# Patient Record
Sex: MALE | Race: OTHER | Employment: OTHER | URBAN - METROPOLITAN AREA
[De-identification: names, ages, dates, MRNs, and addresses within clinical notes are randomized per-mention and may not be internally consistent; named-entity substitution may affect disease eponyms.]

---

## 2019-09-14 ENCOUNTER — APPOINTMENT (OUTPATIENT)
Dept: CT IMAGING | Facility: HOSPITAL | Age: 84
DRG: 391 | End: 2019-09-14
Attending: EMERGENCY MEDICINE
Payer: MEDICARE

## 2019-09-14 ENCOUNTER — HOSPITAL ENCOUNTER (INPATIENT)
Facility: HOSPITAL | Age: 84
LOS: 7 days | Discharge: HOME | DRG: 391 | End: 2019-09-21
Attending: EMERGENCY MEDICINE | Admitting: INTERNAL MEDICINE
Payer: MEDICARE

## 2019-09-14 ENCOUNTER — APPOINTMENT (OUTPATIENT)
Dept: GENERAL RADIOLOGY | Facility: HOSPITAL | Age: 84
DRG: 391 | End: 2019-09-14
Attending: EMERGENCY MEDICINE
Payer: MEDICARE

## 2019-09-14 DIAGNOSIS — K57.20 DIVERTICULITIS OF LARGE INTESTINE WITH PERFORATION AND ABSCESS WITHOUT BLEEDING: Primary | ICD-10-CM

## 2019-09-14 PROCEDURE — 80076 HEPATIC FUNCTION PANEL: CPT | Performed by: EMERGENCY MEDICINE

## 2019-09-14 PROCEDURE — 83605 ASSAY OF LACTIC ACID: CPT | Performed by: EMERGENCY MEDICINE

## 2019-09-14 PROCEDURE — 80048 BASIC METABOLIC PNL TOTAL CA: CPT | Performed by: EMERGENCY MEDICINE

## 2019-09-14 PROCEDURE — 74177 CT ABD & PELVIS W/CONTRAST: CPT | Performed by: EMERGENCY MEDICINE

## 2019-09-14 PROCEDURE — 81001 URINALYSIS AUTO W/SCOPE: CPT | Performed by: EMERGENCY MEDICINE

## 2019-09-14 PROCEDURE — 36415 COLL VENOUS BLD VENIPUNCTURE: CPT

## 2019-09-14 PROCEDURE — 87040 BLOOD CULTURE FOR BACTERIA: CPT | Performed by: EMERGENCY MEDICINE

## 2019-09-14 PROCEDURE — 71045 X-RAY EXAM CHEST 1 VIEW: CPT | Performed by: EMERGENCY MEDICINE

## 2019-09-14 PROCEDURE — 85025 COMPLETE CBC W/AUTO DIFF WBC: CPT | Performed by: EMERGENCY MEDICINE

## 2019-09-14 PROCEDURE — 83735 ASSAY OF MAGNESIUM: CPT | Performed by: EMERGENCY MEDICINE

## 2019-09-14 PROCEDURE — 83690 ASSAY OF LIPASE: CPT | Performed by: EMERGENCY MEDICINE

## 2019-09-14 PROCEDURE — 99285 EMERGENCY DEPT VISIT HI MDM: CPT

## 2019-09-14 PROCEDURE — 96365 THER/PROPH/DIAG IV INF INIT: CPT

## 2019-09-14 RX ORDER — ONDANSETRON 2 MG/ML
4 INJECTION INTRAMUSCULAR; INTRAVENOUS EVERY 6 HOURS PRN
Status: DISCONTINUED | OUTPATIENT
Start: 2019-09-14 | End: 2019-09-21

## 2019-09-14 RX ORDER — MULTIVIT-MIN/IRON/FOLIC ACID/K 18-600-40
2000 CAPSULE ORAL
COMMUNITY

## 2019-09-14 RX ORDER — METOPROLOL TARTRATE 50 MG/1
50 TABLET, FILM COATED ORAL 2 TIMES DAILY
COMMUNITY

## 2019-09-14 RX ORDER — ACETAMINOPHEN 325 MG/1
650 TABLET ORAL EVERY 6 HOURS PRN
Status: DISCONTINUED | OUTPATIENT
Start: 2019-09-14 | End: 2019-09-21

## 2019-09-14 RX ORDER — METOPROLOL TARTRATE 50 MG/1
50 TABLET, FILM COATED ORAL 2 TIMES DAILY
Status: DISCONTINUED | OUTPATIENT
Start: 2019-09-14 | End: 2019-09-21

## 2019-09-14 RX ORDER — SODIUM CHLORIDE 9 MG/ML
INJECTION, SOLUTION INTRAVENOUS CONTINUOUS
Status: DISCONTINUED | OUTPATIENT
Start: 2019-09-14 | End: 2019-09-18

## 2019-09-14 RX ORDER — ACETAMINOPHEN 325 MG/1
TABLET ORAL
Status: COMPLETED
Start: 2019-09-14 | End: 2019-09-14

## 2019-09-14 RX ORDER — MORPHINE SULFATE 2 MG/ML
2 INJECTION, SOLUTION INTRAMUSCULAR; INTRAVENOUS EVERY 4 HOURS PRN
Status: DISCONTINUED | OUTPATIENT
Start: 2019-09-14 | End: 2019-09-21

## 2019-09-14 RX ORDER — ACETAMINOPHEN 325 MG/1
650 TABLET ORAL ONCE
Status: COMPLETED | OUTPATIENT
Start: 2019-09-14 | End: 2019-09-14

## 2019-09-14 RX ORDER — HEPARIN SODIUM 5000 [USP'U]/ML
5000 INJECTION, SOLUTION INTRAVENOUS; SUBCUTANEOUS EVERY 8 HOURS SCHEDULED
Status: DISCONTINUED | OUTPATIENT
Start: 2019-09-14 | End: 2019-09-21

## 2019-09-14 NOTE — ED PROVIDER NOTES
Patient Seen in: [facility] AND [facility] Emergency Department    History   Patient presents with:  Fever (infectious)    Stated Complaint: fever x 2 day, lump in abdomen    HPI    61-year-old male presents for complaint of fever for the past 2 days.   Fever has Neck: Normal range of motion. Neck supple. Cardiovascular: Normal rate, regular rhythm, normal heart sounds and intact distal pulses. No murmur heard. Pulmonary/Chest: Effort normal and breath sounds normal. No accessory muscle usage.  No respiratory d The following orders were created for panel order CBC WITH DIFFERENTIAL WITH PLATELET.   Procedure                               Abnormality         Status                     ---------                               -----------         ------ PROCEDURE: CT ABDOMEN PELVIS IV CONTRAST NO ORAL (ER)  COMPARISON: None. INDICATIONS: Fever and left-sided abdominal lump.   TECHNIQUE: CT images of the abdomen and pelvis were obtained with non-ionic intravenous contrast material.  Automated exposure cont gas and fluid collection. Short segment of distal sigmoid colon also is at the entrance of the left inguinal canal.  There is subcutaneous fat stranding in the left lower quadrant abdominal wall overlying the left inguinal region/groin.  URINARY BLADDER: N Diverticulitis of large intestine with perforation and abscess without bleeding  (primary encounter diagnosis)    Disposition:  Admit  9/14/2019  6:13 pm    Follow-up:  No follow-up provider specified.   We recommend that you schedule follow up care with a

## 2019-09-15 ENCOUNTER — APPOINTMENT (OUTPATIENT)
Dept: INTERVENTIONAL RADIOLOGY/VASCULAR | Facility: HOSPITAL | Age: 84
DRG: 391 | End: 2019-09-15
Attending: RADIOLOGY
Payer: MEDICARE

## 2019-09-15 PROCEDURE — 99152 MOD SED SAME PHYS/QHP 5/>YRS: CPT

## 2019-09-15 PROCEDURE — 10030 IMG GID FLU COLL DRG SFT TIS: CPT

## 2019-09-15 PROCEDURE — 87186 SC STD MICRODIL/AGAR DIL: CPT | Performed by: RADIOLOGY

## 2019-09-15 PROCEDURE — 87206 SMEAR FLUORESCENT/ACID STAI: CPT | Performed by: RADIOLOGY

## 2019-09-15 PROCEDURE — 85025 COMPLETE CBC W/AUTO DIFF WBC: CPT | Performed by: INTERNAL MEDICINE

## 2019-09-15 PROCEDURE — 0W9H3ZZ DRAINAGE OF RETROPERITONEUM, PERCUTANEOUS APPROACH: ICD-10-PCS | Performed by: RADIOLOGY

## 2019-09-15 PROCEDURE — 87116 MYCOBACTERIA CULTURE: CPT | Performed by: RADIOLOGY

## 2019-09-15 PROCEDURE — 87077 CULTURE AEROBIC IDENTIFY: CPT | Performed by: RADIOLOGY

## 2019-09-15 PROCEDURE — 87102 FUNGUS ISOLATION CULTURE: CPT | Performed by: RADIOLOGY

## 2019-09-15 PROCEDURE — 87075 CULTR BACTERIA EXCEPT BLOOD: CPT | Performed by: RADIOLOGY

## 2019-09-15 PROCEDURE — 87070 CULTURE OTHR SPECIMN AEROBIC: CPT | Performed by: RADIOLOGY

## 2019-09-15 PROCEDURE — 0W9F30Z DRAINAGE OF ABDOMINAL WALL WITH DRAINAGE DEVICE, PERCUTANEOUS APPROACH: ICD-10-PCS | Performed by: RADIOLOGY

## 2019-09-15 PROCEDURE — 80048 BASIC METABOLIC PNL TOTAL CA: CPT | Performed by: INTERNAL MEDICINE

## 2019-09-15 PROCEDURE — 87076 CULTURE ANAEROBE IDENT EACH: CPT | Performed by: RADIOLOGY

## 2019-09-15 PROCEDURE — 87205 SMEAR GRAM STAIN: CPT | Performed by: RADIOLOGY

## 2019-09-15 RX ORDER — LIDOCAINE HYDROCHLORIDE 20 MG/ML
INJECTION, SOLUTION EPIDURAL; INFILTRATION; INTRACAUDAL; PERINEURAL
Status: COMPLETED
Start: 2019-09-15 | End: 2019-09-15

## 2019-09-15 RX ORDER — SODIUM CHLORIDE 9 MG/ML
INJECTION, SOLUTION INTRAVENOUS
Status: COMPLETED
Start: 2019-09-15 | End: 2019-09-15

## 2019-09-15 RX ORDER — MIDAZOLAM HYDROCHLORIDE 1 MG/ML
INJECTION INTRAMUSCULAR; INTRAVENOUS
Status: COMPLETED
Start: 2019-09-15 | End: 2019-09-15

## 2019-09-15 NOTE — PRE-SEDATION ASSESSMENT
[facility]BERT [facility]  IR Pre-Procedure Sedation Assessment     History of snoring or sleep or apnea?    No    History of previous problems with anesthesia or sedation  No    Physical Findings:  Neck: nl ROM  CV: RRR  PULM: normal respiratory rate/effo

## 2019-09-15 NOTE — PLAN OF CARE
Problem: PAIN - ADULT  Goal: Verbalizes/displays adequate comfort level or patient's stated pain goal  Description  INTERVENTIONS:  - Encourage pt to monitor pain and request assistance  - Assess pain using appropriate pain scale  - Administer analgesics appropriate  - Identify discharge learning needs (meds, wound care, etc)  - Arrange for interpreters to assist at discharge as needed  - Consider post-discharge preferences of patient/family/discharge partner  - Complete POLST form as appropriate  - Assess function  Description  INTERVENTIONS:  - Assess bowel function  - Maintain adequate hydration with IV or PO as ordered and tolerated  - Evaluate effectiveness of GI medications  - Encourage mobilization and activity  - Obtain nutritional consult as needed stable.

## 2019-09-15 NOTE — PROGRESS NOTES
Report called to [facility]. Left lower quadrant drain to bulb suction. Site intact. See flow sheet for assessment and interventions.

## 2019-09-15 NOTE — CONSULTS
[facility]    Report of Consultation    Saulo Doylesebastián Patient Status:  Inpatient    1935 MRN [id number]   Location [facility] 4W/SW/SE Attending Enedina Pastrana MD   Hosp Day # 1 PCP CHARO DOWLING     Date of Admission: noted.    Physical Exam:  Blood pressure 151/62, pulse 55, temperature 99.4 °F (37.4 °C), temperature source Oral, resp. rate 18, height 5' 6\" (1.676 m), weight 118 lb 11.2 oz (53.8 kg), SpO2 97 %. General: Alert, orientated x3. Cooperative.   HEENT: E OTHER: Negative. CONCLUSION: No acute cardiopulmonary abnormality.    Dictated by (CST): Maura Duque MD on 9/14/2019 at 16:29     Approved by (CST): Maura Duque MD on 9/14/2019 at 16:29          Ct Abdomen Pelvis Iv Contrast, No Oral (er)    Resu throughout the descending and sigmoid colon. No free air, free fluid, or lymphadenopathy in the abdomen or pelvis. Appendix is not identified. No inflammatory changes around the cecum.  ABDOMINAL WALL: Left inguinal hernia is present which contains a per percutaneous drainage  First  But may eventually need exploration of area and possible resection of sigmoid or debridement of abscess      BIGG Spain  9/15/2019  8:35 AM

## 2019-09-15 NOTE — PROCEDURES
[facility] HOSP - [facility]  Procedure Note    Juan Jloli Shahzad Patient Status:  Inpatient    1935 MRN [id number]   Location [facility] Attending Negra Recinos MD   Hosp Day # 1 PCP CHARO DOWLING     Procedure: Fluorsc

## 2019-09-15 NOTE — H&P
[facility] Patient Status:  Inpatient    1935 MRN [id number]   Location [facility] 4W/SW/SE Attending Shelly Jarrett MD   Hosp Day # 0 PAT Harris     Date:  2019  Date of acute distress.   HEENT: PERRLA  Neck: Supple, no carotid bruit or JVD  Lungs: Clear to auscultation bilaterally, No wheezes, crackles  Heart: Regular rate and rhythm, S1 and S2 normal, no murmur, rub or gallop  Abdomen: Soft, left lower quadrant tenderness

## 2019-09-16 PROCEDURE — 84132 ASSAY OF SERUM POTASSIUM: CPT | Performed by: INTERNAL MEDICINE

## 2019-09-16 RX ORDER — HYDRALAZINE HYDROCHLORIDE 20 MG/ML
5 INJECTION INTRAMUSCULAR; INTRAVENOUS ONCE
Status: COMPLETED | OUTPATIENT
Start: 2019-09-16 | End: 2019-09-16

## 2019-09-16 RX ORDER — METRONIDAZOLE 500 MG/100ML
500 INJECTION, SOLUTION INTRAVENOUS EVERY 8 HOURS
Status: DISCONTINUED | OUTPATIENT
Start: 2019-09-16 | End: 2019-09-21

## 2019-09-16 RX ORDER — HYDRALAZINE HYDROCHLORIDE 20 MG/ML
5 INJECTION INTRAMUSCULAR; INTRAVENOUS EVERY 4 HOURS PRN
Status: DISCONTINUED | OUTPATIENT
Start: 2019-09-16 | End: 2019-09-21

## 2019-09-16 NOTE — PROGRESS NOTES
[facility]    Progress Note    Saba Soler Patient Status:  Inpatient    1935 MRN [id number]   Location [facility] 4W/SW/SE Attending Mara Kan MD   Hosp Day # 2 PCP CHARO DOWLING     Subjective:  Feels better  F Jasper Chavez MD on 9/14/2019 at 16:29          Ct Abdomen Pelvis Iv Contrast, No Oral (er)    Result Date: 9/14/2019  PROCEDURE: CT ABDOMEN PELVIS IV CONTRAST NO ORAL (ER)  COMPARISON: None. INDICATIONS: Fever and left-sided abdominal lump.   TECHNIQUE: CT inflammatory changes around the cecum. ABDOMINAL WALL: Left inguinal hernia is present which contains a peripherally enhancing gas and fluid collection.   Short segment of distal sigmoid colon also is at the entrance of the left inguinal canal.  There is barnard Justina Cirri  9/16/2019  8:32 AM

## 2019-09-16 NOTE — PLAN OF CARE
Problem: PAIN - ADULT  Goal: Verbalizes/displays adequate comfort level or patient's stated pain goal  Description  INTERVENTIONS:  - Encourage pt to monitor pain and request assistance  - Assess pain using appropriate pain scale  - Administer analgesics appropriate  - Identify discharge learning needs (meds, wound care, etc)  - Arrange for interpreters to assist at discharge as needed  - Consider post-discharge preferences of patient/family/discharge partner  - Complete POLST form as appropriate  - Assess products/factors, fluids and medications as ordered and appropriate  - Administer supportive blood products/factors as ordered and appropriate  Outcome: Progressing     Problem: Impaired Activities of Daily Living  Goal: Achieve highest/safest level of ind

## 2019-09-16 NOTE — PLAN OF CARE
Patient progressed to clear liquid diet, tolerating well. Continuing IV antibiotics. Patient experienced elevated blood pressure (systolic in the 987'J) this morning. Dr. Isabella Felty notified, and one time dose hydralazine administered.  Repeat blood pressure brandi falls.  - [place] fall precautions as indicated by assessment.  - Educate pt/family on patient safety including physical limitations  - Instruct pt to call for assistance with activity based on assessment  - Modify environment to reduce risk of injury  - Progressing     Problem: Patient Centered Care  Goal: Patient preferences are identified and integrated in the patient's plan of care  Description  Interventions:  - What would you like us to know as we care for you?  Patient is legally blind nd loves to sl support under elbow of weak side to prevent shoulder subluxation  - Encourage patient to incorporate impaired side during daily activities to promote function  Outcome: Progressing     Problem: Impaired Communication  Goal: Patient will achieve maximal com

## 2019-09-16 NOTE — PROGRESS NOTES
[facility]    Progress Note    Arthur Randall Patient Status:  Inpatient    1935 MRN [id number]   Location [facility] 4W/SW/SE Attending Arlene Nunez MD   Hosp Day # 2 PCP CHARO DOWLING     SUBJECTIVE:  Pt seen and exami culture  Continue Zosyn  DVT/GI prophylaxis  Monitor electrolyte and CBC. Started on metoprolol. POC discussed with nursing.     Plan of care discussed in detail with patient and family at bedside.  All questions answered.       Maciej Wong MD   9/16/20

## 2019-09-16 NOTE — PROGRESS NOTES
Patient transferred to room  with all personal belongings. Report given to [facility] RN. Called and updated patient's son Pinky Serrano on status of transfer.

## 2019-09-17 PROCEDURE — 97116 GAIT TRAINING THERAPY: CPT

## 2019-09-17 PROCEDURE — 97161 PT EVAL LOW COMPLEX 20 MIN: CPT

## 2019-09-17 PROCEDURE — 85025 COMPLETE CBC W/AUTO DIFF WBC: CPT | Performed by: INTERNAL MEDICINE

## 2019-09-17 NOTE — PHYSICAL THERAPY NOTE
PHYSICAL THERAPY EVALUATION - INPATIENT     Room Number: 539/539-A  Evaluation Date: 9/17/2019  Type of Evaluation: Initial   Physician Order: PT Eval and Treat    Presenting Problem: diverticulitis of large intestine with perforation and abscess without training  Rehab Potential : Good  Frequency (Obs): 5x/week  Number of Visits to Meet Established Goals: 5    PHYSICAL THERAPY MEDICAL/SOCIAL HISTORY     History related to current admission: Pt was admitted on 9/14/19 secondary to complaints of fever of 10 -    ADDITIONAL TESTS  Additional Tests: Elderly Mobility Scale     Elderly Mobility Scale: 14/20                           NEUROLOGICAL FINDINGS                      ACTIVITY TOLERANCE  Pulse: 65  Heart Rate Source: Monitor                   O2 WALK juice and coffee. Pt left up in chair with all needs in reach. RN notified of session.      Exercise/Education Provided:  Gait training  Transfer training    Patient End of Session: Up in chair;Needs met;Call light within reach;RN aware of session/findings;

## 2019-09-17 NOTE — PROGRESS NOTES
[facility]    Progress Note    Med Matthew Patient Status:  Inpatient    1935 MRN [id number]   Location [facility] 5SW/SE Attending Rowena Hanson MD   Hosp Day # 3 PCP CHARO DOWLING     Subjective:  Feels ok  States 9/14/2019 at 16:29          Ct Abdomen Pelvis Iv Contrast, No Oral (er)    Result Date: 9/14/2019  PROCEDURE: CT ABDOMEN PELVIS IV CONTRAST NO ORAL (ER)  COMPARISON: None. INDICATIONS: Fever and left-sided abdominal lump.   TECHNIQUE: CT images of the abdo the cecum. ABDOMINAL WALL: Left inguinal hernia is present which contains a peripherally enhancing gas and fluid collection.   Short segment of distal sigmoid colon also is at the entrance of the left inguinal canal.  There is subcutaneous fat stranding in

## 2019-09-17 NOTE — PROGRESS NOTES
[facility]    Progress Note    Calin Franco Patient Status:  Inpatient    1935 MRN [id number]   Location [facility] 5SW/SE Attending Marley Richter MD   Hosp Day # 3 PCP CHARO DOWLING     Subjective:  Feels better  [facility] 9/14/2019 at 16:29     Approved by (CST): Patricia Pike MD on 9/14/2019 at 16:29          Ct Abdomen Pelvis Iv Contrast, No Oral (er)    Result Date: 9/14/2019  PROCEDURE: CT ABDOMEN PELVIS IV CONTRAST NO ORAL (ER)  COMPARISON: None.   INDICATIONS: Fever and Appendix is not identified. No inflammatory changes around the cecum. ABDOMINAL WALL: Left inguinal hernia is present which contains a peripherally enhancing gas and fluid collection.   Short segment of distal sigmoid colon also is at the entrance of the l

## 2019-09-17 NOTE — DIETARY NOTE
ADULT NUTRITION INITIAL ASSESSMENT    Pt is at moderate nutrition risk. Pt does not meet malnutrition criteria.       RECOMMENDATIONS TO MD:  See Nutrition Intervention     NUTRITION DIAGNOSIS/PROBLEM:  Inadequate oral intake related to Altered GI function Classification: 19-24.9 kg/m2 - WNL  IBW: 142#         83% IBW       Usual Body Wt: 127# per pt        93% UBW  WEIGHT HISTORY:   Wt Readings from Last 6 Encounters:  09/14/19 : 53.8 kg (118 lb 11.2 oz)    Patient Weight(s) for the past 336 hrs:   Weight

## 2019-09-17 NOTE — PROGRESS NOTES
[facility]    Progress Note    Jenny Abbott Patient Status:  Inpatient    1935 MRN [id number]   Location [facility] 5SW/SE Attending Kely Raza MD   Hosp Day # 3 PCP CHARO DOWLING     SUBJECTIVE:  Pt seen and examine IR.     # HTN     Plan:  Diet advance to full liquid diet, Tolerating well. IV hydration  Pain control  Antiemetic  Aspirate culture growing E. coli. Continue Zosyn  DVT/GI prophylaxis  Continue metoprolol.   Plan for repeat CT abdomen with oral contrast

## 2019-09-17 NOTE — PLAN OF CARE
Problem: Patient/Family Goals  Goal: Patient/Family Long Term Goal  Description  Patient's Long Term Goal: go home    Interventions:  - continue iv antibiotics  -monitor intake and output  -monitor VS  -follow MD order  - See additional Care Plan goals f including physical limitations  - Instruct pt to call for assistance with activity based on assessment  - Modify environment to reduce risk of injury  - Provide assistive devices as appropriate  - Consider OT/PT consult to assist with strengthening/mobilit patient's plan of care  Description  Interventions:  - What would you like us to know as we care for you? Patient is legally blind nd loves to sleep.   - Provide timely, complete, and accurate information to patient/family  - Incorporate patient and family potential  Description  Interventions:  - Encourage use of alternative/augmentative communication to express basic wants and needs (use of communication/letter board/or smartphone/tablet/handwriting)  Outcome: Progressing   Rec'd patient from 4th floor [place]

## 2019-09-17 NOTE — PLAN OF CARE
Problem: Patient/Family Goals  Goal: Patient/Family Long Term Goal  Description  Patient's Long Term Goal: go home    Interventions:  - continue iv antibiotics  -monitor intake and output  -monitor VS  -follow MD order  - See additional Care Plan goals f safety including physical limitations  - Instruct pt to call for assistance with activity based on assessment  - Modify environment to reduce risk of injury  - Provide assistive devices as appropriate  - Consider OT/PT consult to assist with strengthening/ patient  Outcome: Progressing     Problem: Patient Centered Care  Goal: Patient preferences are identified and integrated in the patient's plan of care  Description  Interventions:  - What would you like us to know as we care for you?  Patient is legally bl self-care    Outcome: Progressing     Problem: Impaired Communication  Goal: Patient will achieve maximal communication potential  Description  Interventions:    Outcome: Progressing    Patient with MEENAKSHI drain on LLQ of abdomen- pinned to gown for safety in

## 2019-09-18 ENCOUNTER — APPOINTMENT (OUTPATIENT)
Dept: CT IMAGING | Facility: HOSPITAL | Age: 84
DRG: 391 | End: 2019-09-18
Attending: SPECIALIST
Payer: MEDICARE

## 2019-09-18 PROCEDURE — 97116 GAIT TRAINING THERAPY: CPT

## 2019-09-18 PROCEDURE — 80048 BASIC METABOLIC PNL TOTAL CA: CPT | Performed by: INTERNAL MEDICINE

## 2019-09-18 PROCEDURE — 74177 CT ABD & PELVIS W/CONTRAST: CPT | Performed by: SPECIALIST

## 2019-09-18 PROCEDURE — 97110 THERAPEUTIC EXERCISES: CPT

## 2019-09-18 RX ORDER — ZOLPIDEM TARTRATE 5 MG/1
5 TABLET ORAL ONCE
Status: COMPLETED | OUTPATIENT
Start: 2019-09-18 | End: 2019-09-18

## 2019-09-18 RX ORDER — POTASSIUM CHLORIDE 20 MEQ/1
40 TABLET, EXTENDED RELEASE ORAL ONCE
Status: COMPLETED | OUTPATIENT
Start: 2019-09-18 | End: 2019-09-18

## 2019-09-18 RX ORDER — AMLODIPINE BESYLATE 10 MG/1
10 TABLET ORAL DAILY
Status: DISCONTINUED | OUTPATIENT
Start: 2019-09-18 | End: 2019-09-21

## 2019-09-18 NOTE — CM/SW NOTE
09/18/19 1100   CM/SW Screening   Referral Source Nurse;;Social Work (self-referral)   [address] staff; Chart review;Nursing rounds   Patient's Mental Status Alert;Oriented   Patient's [address]   Number of Levels in

## 2019-09-18 NOTE — CM/SW NOTE
CM met with pt at the bedside to discuss dc plan. PT rec [facility] PT. Pt deferring decisions to his son, Sandra Chacon. Son contacted by phone and aware of PT rec for [facility]. Son states he spoke with his dad about it and his dad is opposed to the plan.  MIL is in the

## 2019-09-18 NOTE — PROGRESS NOTES
[facility]    Progress Note    Jules Cueto Patient Status:  Inpatient    1935 MRN [id number]   Location [facility] 5SW/SE Attending Kristofer Ferraro MD   Hosp Day # 4 PCP CHARO DOWLING     Subjective:  Feels same   No b 9/14/2019 at 16:29     Approved by (CST): Sandi Auguste MD on 9/14/2019 at 16:29          Ct Abdomen Pelvis Iv Contrast, No Oral (er)    Result Date: 9/14/2019  PROCEDURE: CT ABDOMEN PELVIS IV CONTRAST NO ORAL (ER)  COMPARISON: None.   INDICATIONS: Fever and Appendix is not identified. No inflammatory changes around the cecum. ABDOMINAL WALL: Left inguinal hernia is present which contains a peripherally enhancing gas and fluid collection.   Short segment of distal sigmoid colon also is at the entrance of the l

## 2019-09-18 NOTE — PHYSICAL THERAPY NOTE
PHYSICAL THERAPY TREATMENT NOTE - INPATIENT     Room Number: 539/539-A       Presenting Problem: diverticulitis of large intestine with perforation and abscess without bleeding    Problem List  Principal Problem:    Diverticulitis of large intestine with p INPATIENT SHORT FORM - BASIC MOBILITY  How much difficulty does the patient currently have. ..  -   Turning over in bed (including adjusting bedclothes, sheets and blankets)?: None   -   Sitting down on and standing up from a chair with arms (e.g., wheelcha #5 Patient to demonstrate independence with home activity/exercise instructions provided to patient in preparation for discharge.    Goal #5   Current Status In progress   Goal #6    Goal #6  Current Status

## 2019-09-18 NOTE — PROGRESS NOTES
[facility]    Progress Note    Aloha Romaine Patient Status:  Inpatient    1935 MRN [id number]   Location [facility] 5SW/SE Attending Adamaris Ford MD   Hosp Day # 4 PCP CHARO DOWLING     SUBJECTIVE:  Pt seen and examine Tartrate (LOPRESSOR) tab 50 mg 50 mg Oral BID       Assessment:     Patient Active Problem List:     Diverticulitis of large intestine with perforation and abscess without bleeding-drainage placement by IR.     # Hypokalemia  # HTN     Plan:  Diet advance

## 2019-09-18 NOTE — PLAN OF CARE
Problem: Patient/Family Goals  Goal: Patient/Family Long Term Goal  Description  Patient's Long Term Goal: go home    Interventions:  - continue iv antibiotics  -monitor intake and output  -monitor VS  -follow MD order  - See additional Care Plan goals f [place] fall precautions as indicated by assessment.  - Educate pt/family on patient safety including physical limitations  - Instruct pt to call for assistance with activity based on assessment  - Modify environment to reduce risk of injury  - Provide a overcome with those who interact with patient  Outcome: Progressing     Problem: Patient Centered Care  Goal: Patient preferences are identified and integrated in the patient's plan of care  Description  Interventions:  - What would you like us to know as bathing  - Educate and encourage patient/family in tolerated functional activity level and precautions during self-care  Outcome: Progressing     Problem: Impaired Communication  Goal: Patient will achieve maximal communication potential  Description  Inte

## 2019-09-18 NOTE — PLAN OF CARE
Problem: Patient/Family Goals  Goal: Patient/Family Long Term Goal  Description  Patient's Long Term Goal: go home    Interventions:  - continue iv antibiotics  -monitor intake and output  -monitor VS  -follow MD order  - See additional Care Plan goals f limitations  - Instruct pt to call for assistance with activity based on assessment  - Modify environment to reduce risk of injury  - Provide assistive devices as appropriate  - Consider OT/PT consult to assist with strengthening/mobility  - Encourage toil care  Description  Interventions:  - What would you like us to know as we care for you? Patient is legally blind nd loves to sleep.   - Provide timely, complete, and accurate information to patient/family  - Incorporate patient and family knowledge, values, potential  Description  Interventions:    Outcome: Progressing

## 2019-09-18 NOTE — PLAN OF CARE
Problem: Patient/Family Goals  Goal: Patient/Family Long Term Goal  Description  Patient's Long Term Goal: go home    Interventions:  - continue iv antibiotics  -monitor intake and output  -monitor VS  -follow MD order  - See additional Care Plan goals f limitations  - Instruct pt to call for assistance with activity based on assessment  - Modify environment to reduce risk of injury  - Provide assistive devices as appropriate  - Consider OT/PT consult to assist with strengthening/mobility  - Encourage toil care  Description  Interventions:  - What would you like us to know as we care for you? Patient is legally blind nd loves to sleep.   - Provide timely, complete, and accurate information to patient/family  - Incorporate patient and family knowledge, values, potential  Description  Interventions:    Outcome: Progressing     Patient a/ox4; denies pain; VSS, given Hydralazine PRN for elevated BPs; fall precautions maintained; up to bathroom with 1 assist; MEENAKSHI drain intact, irrigated per orders; son at bedside [place]

## 2019-09-19 PROCEDURE — 97110 THERAPEUTIC EXERCISES: CPT

## 2019-09-19 PROCEDURE — 84132 ASSAY OF SERUM POTASSIUM: CPT | Performed by: INTERNAL MEDICINE

## 2019-09-19 PROCEDURE — 97116 GAIT TRAINING THERAPY: CPT

## 2019-09-19 NOTE — PHYSICAL THERAPY NOTE
PHYSICAL THERAPY TREATMENT NOTE - INPATIENT     Room Number: 539/539-A       Presenting Problem: diverticulitis of large intestine with perforation and abscess without bleeding    Problem List  Principal Problem:    Diverticulitis of large intestine with p Sitting down on and standing up from a chair with arms (e.g., wheelchair, bedside commode, etc.): None   -   Moving from lying on back to sitting on the side of the bed?: None   How much help from another person does the patient currently need. ..   -   Mov Goal #6  Current Status

## 2019-09-19 NOTE — PROGRESS NOTES
[facility]    [facility] Note    Arthur Randall Patient Status:  Inpatient    1935 MRN [id number]   Location [facility] 5SW/SE Attending Arlene Nunez MD   Hosp Day # 5 PCP CHARO DOWLING     Subjective:  Feels better over Registry. FINDINGS:  LIVER: No liver mass is identified. BILIARY: The gallbladder is present. No intra-or extrahepatic bile duct dilatation. SPLEEN: Normal size. PANCREAS: No fluid collection or ductal dilatation. ADRENALS: No mass or enlargement.  KIDNEYS quadrant with luminal communication (fistula) with the anti mesenteric margin of the proximal sigmoid colon previously inflamed. The ill-defined collection contains a small focus of gas and enteric contrast.  No new collections.   Follow-up colonoscopy rec information is transmitted to the ACR ([facility]) NRDR ([address]) which includes the Dose Index Registry. FINDINGS:  LIVER: No enlargement, atrophy, abnormal density, or significant focal lesion.   BILIARY: The g BONES:   Mild degenerative change within the spine. LUNG BASES: No visible pulmonary or pleural disease. OTHER: Negative. CONCLUSION:  1. There is extensive diverticulosis in the descending and sigmoid colon.   A short segment of the distal descen

## 2019-09-19 NOTE — PLAN OF CARE
Problem: Patient/Family Goals  Goal: Patient/Family Long Term Goal  Description  Patient's Long Term Goal: go home    Interventions:  - continue iv antibiotics  -monitor intake and output  -monitor VS  -follow MD order  - See additional Care Plan goals f limitations  - Instruct pt to call for assistance with activity based on assessment  - Modify environment to reduce risk of injury  - Provide assistive devices as appropriate  - Consider OT/PT consult to assist with strengthening/mobility  - Encourage toil care  Description  Interventions:  - What would you like us to know as we care for you? Patient is legally blind nd loves to sleep.   - Provide timely, complete, and accurate information to patient/family  - Incorporate patient and family knowledge, values, Communication  Goal: Patient will achieve maximal communication potential  Description  Interventions:  - Ask \"yes/no\" questions to facilitate patient's ability to communicate wants and needs  Outcome: Progressing    No complaints of pain, MEENAKSHI drain with

## 2019-09-19 NOTE — PROGRESS NOTES
[facility]    Progress Note    Moraima Pang Patient Status:  Inpatient    1935 MRN [id number]   Location [facility] 5SW/SE Attending Ishan Reyes MD   Hosp Day # 5 PCP CHARO DOWLING     SUBJECTIVE:  Pt seen and examine Tartrate (LOPRESSOR) tab 50 mg 50 mg Oral BID       Assessment:  Patient Active Problem List:     Diverticulitis of large intestine with perforation and abscess without bleeding-drainage placement by IR.     # Hypokalemia  # HTN     Plan:  Diet advance to

## 2019-09-19 NOTE — PLAN OF CARE
Problem: Patient/Family Goals  Goal: Patient/Family Long Term Goal  Description  Patient's Long Term Goal: go home    Interventions:  - continue iv antibiotics  -monitor intake and output  -monitor VS  -follow MD order  - See additional Care Plan goals f limitations  - Instruct pt to call for assistance with activity based on assessment  - Modify environment to reduce risk of injury  - Provide assistive devices as appropriate  - Consider OT/PT consult to assist with strengthening/mobility  - Encourage toil care  Description  Interventions:  - What would you like us to know as we care for you? Patient is legally blind nd loves to sleep.   - Provide timely, complete, and accurate information to patient/family  - Incorporate patient and family knowledge, values, potential  Description  Interventions:  Outcome: Progressing   Pt has LLQ abd MEENAKSHI drain, Dr. Sujey Salinas updated on pt condition. Ordered to DC flush drain orders.

## 2019-09-20 PROCEDURE — 81001 URINALYSIS AUTO W/SCOPE: CPT | Performed by: SPECIALIST

## 2019-09-20 NOTE — PROGRESS NOTES
[facility]    Progress Note    Kaba Paget Patient Status:  Inpatient    1935 MRN [id number]   Location [facility] 5SW/SE Attending Marcy Grier MD   Hosp Day # 6 PCP CHARO DOWLING     SUBJECTIVE:  Pt seen and examine culture growing E. coli. Continue Zosyn  DVT/GI prophylaxis  Continue metoprolol.    Possible discharge tomorrow on oral antibiotic.     Plan of care discussed in detail with patient and family at bedside.  All questions answered.                 Pippa Grimes

## 2019-09-20 NOTE — PLAN OF CARE
Problem: Patient/Family Goals  Goal: Patient/Family Long Term Goal  Description  Patient's Long Term Goal: go home    Interventions:  - continue iv antibiotics  -monitor intake and output  -monitor VS  -follow MD order  - See additional Care Plan goals f limitations  - Instruct pt to call for assistance with activity based on assessment  - Modify environment to reduce risk of injury  - Provide assistive devices as appropriate  - Consider OT/PT consult to assist with strengthening/mobility  - Encourage toil care  Description  Interventions:  - What would you like us to know as we care for you? Patient is legally blind nd loves to sleep.   - Provide timely, complete, and accurate information to patient/family  - Incorporate patient and family knowledge, values, potential  Description  Interventions:    Outcome: Progressing  Patient informed on plan of care. All question answered to the best of my ability. Call light within reach, bed locked, and belonging within reach.

## 2019-09-20 NOTE — PROGRESS NOTES
[facility]    Progress Note    Aidee Landon Patient Status:  Inpatient    1935 MRN [id number]   Location [facility] 5SW/SE Attending Aidan Gomez MD   Hosp Day # 6 PCP CHARO DOWLING     Subjective:  Feels ok   Had BM Index Registry. FINDINGS:  LIVER: No liver mass is identified. BILIARY: The gallbladder is present. No intra-or extrahepatic bile duct dilatation. SPLEEN: Normal size. PANCREAS: No fluid collection or ductal dilatation. ADRENALS: No mass or enlargement.  K quadrant with luminal communication (fistula) with the anti mesenteric margin of the proximal sigmoid colon previously inflamed. The ill-defined collection contains a small focus of gas and enteric contrast.  No new collections.   Follow-up colonoscopy rec information is transmitted to the [facility] NRDR ([address]) which includes the Dose Index Registry. FINDINGS:  LIVER: No enlargement, atrophy, abnormal density, or significant focal lesion.   BILIARY: The g BONES:   Mild degenerative change within the spine. LUNG BASES: No visible pulmonary or pleural disease. OTHER: Negative. CONCLUSION:  1. There is extensive diverticulosis in the descending and sigmoid colon.   A short segment of the distal descen

## 2019-09-21 VITALS
SYSTOLIC BLOOD PRESSURE: 139 MMHG | DIASTOLIC BLOOD PRESSURE: 60 MMHG | WEIGHT: 118.69 LBS | RESPIRATION RATE: 16 BRPM | BODY MASS INDEX: 19.08 KG/M2 | HEIGHT: 66 IN | TEMPERATURE: 98 F | HEART RATE: 56 BPM | OXYGEN SATURATION: 97 %

## 2019-09-21 PROCEDURE — 84132 ASSAY OF SERUM POTASSIUM: CPT | Performed by: INTERNAL MEDICINE

## 2019-09-21 PROCEDURE — 85025 COMPLETE CBC W/AUTO DIFF WBC: CPT | Performed by: INTERNAL MEDICINE

## 2019-09-21 PROCEDURE — 80048 BASIC METABOLIC PNL TOTAL CA: CPT | Performed by: INTERNAL MEDICINE

## 2019-09-21 RX ORDER — POTASSIUM CHLORIDE 20 MEQ/1
40 TABLET, EXTENDED RELEASE ORAL EVERY 4 HOURS
Status: COMPLETED | OUTPATIENT
Start: 2019-09-21 | End: 2019-09-21

## 2019-09-21 RX ORDER — METRONIDAZOLE 500 MG/1
500 TABLET ORAL 3 TIMES DAILY
Qty: 21 TABLET | Refills: 0 | Status: SHIPPED | OUTPATIENT
Start: 2019-09-21 | End: 2019-09-28

## 2019-09-21 RX ORDER — AMOXICILLIN AND CLAVULANATE POTASSIUM 875; 125 MG/1; MG/1
1 TABLET, FILM COATED ORAL 2 TIMES DAILY
Qty: 14 TABLET | Refills: 0 | Status: SHIPPED | OUTPATIENT
Start: 2019-09-21 | End: 2019-09-28

## 2019-09-21 RX ORDER — AMLODIPINE BESYLATE 10 MG/1
10 TABLET ORAL DAILY
Qty: 30 TABLET | Refills: 0 | Status: SHIPPED | OUTPATIENT
Start: 2019-09-22 | End: 2019-10-22

## 2019-09-21 NOTE — PLAN OF CARE
Problem: Patient/Family Goals  Goal: Patient/Family Long Term Goal  Description  Patient's Long Term Goal: go home    Interventions:  - continue iv antibiotics  -monitor intake and output  -monitor VS  -follow MD order  - See additional Care Plan goals f limitations  - Instruct pt to call for assistance with activity based on assessment  - Modify environment to reduce risk of injury  - Provide assistive devices as appropriate  - Consider OT/PT consult to assist with strengthening/mobility  - Encourage toil care  Description  Interventions:  - What would you like us to know as we care for you? Patient is legally blind nd loves to sleep.   - Provide timely, complete, and accurate information to patient/family  - Incorporate patient and family knowledge, values, potential  Description  Interventions:  2  Outcome: Progressing

## 2019-09-21 NOTE — PLAN OF CARE
Patient is A&O x3. Ambulates with standby assistance. No pain reported. MEENAKSHI drain care, emptying, and measuring will be taught to patient and his Son, Guillermo Arauz when he arrives later today. Son, Guillermo Arauz has agreed to be responsible for MEENAKSHI drain care.  Supplies period  Description  INTERVENTIONS  - Monitor WBC  - Administer growth factors as ordered  - Implement neutropenic guidelines  Outcome: Adequate for Discharge     Problem: SAFETY ADULT - FALL  Goal: Free from fall injury  Description  INTERVENTIONS:  - Ass communication aides and strategies  - Use visual cues when possible  - Listen attentively, be patient, do not interrupt  - Minimize distractions  - Allow time for understanding and response  - Establish method for patient to ask for assistance (call light) Problem: Impaired Activities of Daily Living  Goal: Achieve highest/safest level of independence in self care  Description  Interventions:  - Assess ability and encourage patient to participate in ADLs to maximize function  - Promote sitting position i

## 2019-09-21 NOTE — PROGRESS NOTES
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 BILIARY: The gallbladder is present. No intra-or extrahepatic bile duct dilatation. SPLEEN: Normal size. PANCREAS: No fluid collection or ductal dilatation. ADRENALS: No mass or enlargement. KIDNEYS: No hydronephrosis.  Symmetric enhance (fistula) with the anti mesenteric margin of the proximal sigmoid colon previously inflamed. The ill-defined collection contains a small focus of gas and enteric contrast.  No new collections.   Follow-up colonoscopy recommended to exclude inflammatory mas ([facility]) NRDR ([address]) which includes the Dose Index Registry. FINDINGS:  LIVER: No enlargement, atrophy, abnormal density, or significant focal lesion. BILIARY: The gallbladder is present.   No intra- or e within the spine. LUNG BASES: No visible pulmonary or pleural disease. OTHER: Negative. CONCLUSION:  1. There is extensive diverticulosis in the descending and sigmoid colon.   A short segment of the distal descending colon extends towards the ent

## 2019-09-22 NOTE — DISCHARGE PLANNING
Son Nghia Budd at bedside with father. Discharge AVS reviewed. Teaching on MEENAKSHI drain performed and son successfully did a return demonstration on emptying drain and closing bulb to suction. Plan for discharge home tonight.

## 2019-09-29 NOTE — DISCHARGE SUMMARY
[facility]    Discharge Summary    Katty Armenta Patient Status:  Inpatient    1935 MRN [id number]   Location [facility] 5SW/SE Attending No att. providers found   Hosp Day # 7 PCP Gardenia James     Date of Admission: 9 with surgery in 1 to 2 weeks.     Consultations: Surgery, IR    Procedures: Aspiration of abscess and drainage placement    Complications: None    Discharge Condition: Stable         Discharge Medications:      Discharge Medications      START taking these

## (undated) NOTE — LETTER
[facility], [place]  Authorization for Invasive Procedures  1.  I hereby authorize Dr. Alfie Hernandez M.D. , my physician and whomever may be designated as the doctor's assistant, to perform the following operation and/or procedu 5. I consent to the photographing of the operations or procedures to be performed for the purposes of advancing medicine, science, and/or education, provided my identity is not revealed.  If the procedure has been videotaped, the physician/surgeon will obta __________ Time: ___________    Statement of Physician  My signature below affirms that prior to the time of the procedure, I have explained to the patient and/or his legal representative, the risks and benefits involved in the proposed treatment and any r